# Patient Record
Sex: FEMALE | Race: WHITE
[De-identification: names, ages, dates, MRNs, and addresses within clinical notes are randomized per-mention and may not be internally consistent; named-entity substitution may affect disease eponyms.]

---

## 2018-11-27 ENCOUNTER — HOSPITAL ENCOUNTER (INPATIENT)
Dept: HOSPITAL 44 - OPSURG | Age: 53
LOS: 2 days | Discharge: HOME | DRG: 552 | End: 2018-11-29
Attending: FAMILY MEDICINE | Admitting: FAMILY MEDICINE
Payer: COMMERCIAL

## 2018-11-27 VITALS — BODY MASS INDEX: 38.7 KG/M2

## 2018-11-27 DIAGNOSIS — M51.37: Primary | ICD-10-CM

## 2018-11-27 PROCEDURE — 99238 HOSP IP/OBS DSCHRG MGMT 30/<: CPT

## 2018-11-27 PROCEDURE — 80048 BASIC METABOLIC PNL TOTAL CA: CPT

## 2018-11-27 PROCEDURE — 72100 X-RAY EXAM L-S SPINE 2/3 VWS: CPT

## 2018-11-27 PROCEDURE — S0028 INJECTION, FAMOTIDINE, 20 MG: HCPCS

## 2018-11-27 PROCEDURE — 63042 LAMINOTOMY SINGLE LUMBAR: CPT

## 2018-11-27 PROCEDURE — 85025 COMPLETE CBC W/AUTO DIFF WBC: CPT

## 2018-11-27 PROCEDURE — 81025 URINE PREGNANCY TEST: CPT

## 2018-11-27 PROCEDURE — 99231 SBSQ HOSP IP/OBS SF/LOW 25: CPT

## 2018-11-27 PROCEDURE — 99232 SBSQ HOSP IP/OBS MODERATE 35: CPT

## 2018-11-27 RX ADMIN — FLUCONAZOLE SCH MG: 150 TABLET ORAL at 18:14

## 2018-11-27 RX ADMIN — HEPARIN SODIUM SCH UNIT: 5000 INJECTION, SOLUTION INTRAVENOUS; SUBCUTANEOUS at 20:41

## 2018-11-27 RX ADMIN — ZOLPIDEM TARTRATE PRN MG: 5 TABLET ORAL at 21:45

## 2018-11-27 RX ADMIN — DOCUSATE SODIUM SCH MG: 100 CAPSULE, LIQUID FILLED ORAL at 20:32

## 2018-11-27 RX ADMIN — GABAPENTIN SCH MG: 300 CAPSULE ORAL at 18:14

## 2018-11-27 NOTE — HISTORY AND PHYSICAL REPORT
History of Present Illnes





- History of Present Illness


Reason for Visit: chronic lower back pain


History of Present Illness: 





Patient is a 53-year-old white female who stated in 2016 she fell off the 

balcony and felt two stories and sustained several burst fracture is to her 

lumbar spine as well as some other fractures to her thoracic spine. Patient 

states that she has subsequently had a posterior lumbar fusion that. Patient has

over the last six month been having increasing pain in her lower back. Was found

to have a loose screw in her sacral area. Patient stated she has been having 

some radicular pain in her legs bilaterally right side greater than left. 

Patient does have some intermittent numbness attend to come and go. Patient is 

not able to stand for a long period of time. Patient states that she is been 

having some difficulties with walking related to the pain.  Patient has been 

seen by Dr. Norris and was felt that she would benefit from anterior cervical 

fusion and cage placement. Patient subsequently have it done on the day of 

admission. Patient did not appear to have any intraoperative complications. 

Patient was transferred to the floor in stable condition.





- Past Medical History


Cardiac: HTN, Hyperlipidemia


CNS: Other (Decrease hearing)


Gastrointestinal: GERD, Irritable bowel disease


Heme/Onc: Anemia NOS


Hepatobiliary: Hep A/B/C (Hep C NOT TREATED)


Psych: Anxiety, Addictions (? HX OF SUBSTANCE ABUSE)


Musculoskeletal: Chronic low back pain, Osteoarthritis


Renal/: Acute renal failure (RESOLVED)





- Past Surgical History


Past Surgical History:  (x3), Tubal Ligation, Other (ORIF right ankle 

fx, posterior lumbar fusion 2016)





- Past Family History


  ** Mother


Family History: DM,  (66yo), Other (COPD)





  ** Brother 1


Family History:  (COPD)





- Past Social History


Smoke: No


Occupation: disabled


Alcohol: None


Drugs: None


Lives: Alone





- Health Maintenance


Health Maintenance: Influenza Vaccine, Mammogram (patient is scheduled for one)


Influenza Vaccine: Current for this Influenza Season


Pneumonia Vaccine: No


Resuscitation Status: 


Resusciation Status





Resuscitation Status             Full Code














- Unable to Obtain History


Unable to Obtain: No





Review of Systems





- Review of Systems


Constitutional: negative: Fever, Chills, Sweats, Weakness


Eyes: other (glasses).  negative: pain


ENT: negative: Nose Pain, Nose Discharge, Mouth Pain, Throat Swelling


Respiratory: SOB with Excertion.  negative: Cough, Dry, Hemoptysis, Pleuritic Pa

in, Sputum


Cardiovascular: negative: Chest Pain, Orthopnea, Edema, Light Headedness


Gastrointestinal: Diarrhea, Constipation.  negative: Nausea, Vomiting, Abdominal

Pain, Melena, Hematochezia


Genitourinary: Other (recent UTI treated).  negative: Dysuria, Frequency, 

Incontinence


Musculoskeletal: Back Pain


Skin: negative: Rash


Neurological: Numbness (intermittent to feet bilaterally)





- Medications/Allergies


Allergies/Adverse Reactions: 


                                    Allergies











Allergy/AdvReac Type Severity Reaction Status Date / Time


 


nalbuphine [From Nubain] AdvReac  Tremors Verified 18 16:51














Home Medications: 


                                Home Medications





Clonazepam [Klonopin] 2 mg PO TID 18 


Clonidine HCl [Catapres] 0.1 mg PO TID 18 


Fluoxetine HCl [Prozac] 40 mg PO D 18 


Gabapentin [Neurontin] 300 mg PO TID 18 


Hydroxyzine Pamoate 50 mg PO BID 18 


Ibuprofen [Ibu] 600 mg PO Q6 PRN 18 


Promethazine HCl [Phenergan] 25 mg PO Q6 PRN 18 


Tizanidine HCl [Zanaflex] 4 mg PO TID PRN 18 


Zolpidem Tartrate 10 mg PO HS 18 











Current Inpatient Medications: 


                          Current Inpatient Medications





Acetaminophen (Tylenol Extra Strength)  500 mg PO Q4 PRN


   PRN Reason: for mild pain 1-4/fever


Bisacodyl (Dulcolax)  10 mg RC DAILY PRN


   PRN Reason: Constipation


Diazepam (Valium)  5 mg PO Q8H PRN


   PRN Reason: Anxiety/muscle spasm


   Stop: 18 16:38


Diphenhydramine HCl (Benadryl)  25 mg PO Q4 PRN


   PRN Reason: Itching/Pruritis or insomnia


Docusate Sodium (Colace)  100 mg PO BID JJ


   Stop: 18 09:01


Fentanyl Citrate (Duragesic)  50 mcg IVP Q2H PRN


   PRN Reason: Moderate pain 5-7


   Stop: 18 16:38


Fentanyl Citrate (Duragesic)  100 mcg IVP Q2H PRN


   PRN Reason: For Severe Pain 8-10


   Stop: 18 16:38


Heparin Sodium (Porcine) (Heparin)  5,000 unit SQ Q12 JJ


Hydromorphone HCl (Dilaudid)  4 mg PO Q4H PRN


   PRN Reason: Severe Pain 8-10


   Stop: 18 16:38


Sodium Chloride (Normal Saline)  1,000 mls @ 75 mls/hr IV Q10H JJ


   Stop: 18 16:59


Promethazine HCl 25 mg/ Sodium (Chloride)  51 mls @ 600 mls/hr IV Q6 PRN


   PRN Reason: Nausea / Vomiting


   Stop: 18 16:38


Ketorolac Tromethamine (Toradol)  15 mg IVP Q6 PRN


   PRN Reason: For Mild Pain 1-4


   Stop: 18 16:38


Levalbuterol HCl (Xopenex)  1.25 mg NEB Q4 PRN


   PRN Reason: SOA, Dyspnea, or Wheezing


   Stop: 18 16:38


Morphine Sulfate (Depodur)  4 mg IVP Q2 PRN


   PRN Reason: For Severe Pain 8-10


   Stop: 18 16:38


Ondansetron HCl (Zofran 4 Mg/2 Ml)  4 mg IVP Q6H PRN


   PRN Reason: Nausea / Vomiting


   Stop: 18 16:38


Oxycodone HCl (Percolone)  10 mg PO Q4 PRN


   PRN Reason: For Moderate Pain 5-7


Oxycodone/Acetaminophen (Percocet 5-325 Mg Tablet)  1 each PO Q4 PRN


   PRN Reason: For pain 1-4 out of 10


Oxycodone/Acetaminophen (Percocet 5-325 Mg Tablet)  2 each PO Q4 PRN


   PRN Reason: Moderate pain 5-7














Exam





- Exam


Vital Signs: 


                             Vital Signs (72 hours)











  18





  16:33 16:39 17:01


 


Temperature 97.7 F  97.0 F L


 


Pulse Rate [ 86  91 H





Right]   


 


Respiratory 20  20





Rate   


 


Blood Pressure 155/92  151/76





[Right Arm]   


 


O2 Sat by Pulse 93 98 98





Oximetry   














General: Alert, Oriented to Person, Oriented to Place, Oriented to Time, 

Moderate distress, Obese


HEENT: Atraumatic, Mouth Mucous membr. moist/Pink, Nose Mucous membr. 

moist/Pink, Dentition Normal, Decreased Hearing Acuity


Neck: Normal Range of Motion.  No: Lymphadenopathy


Carotids: 





WNL


Thyroid: 





WNL


Lungs: Clear to auscultation, Normal air movement, Speaks full Sentences.  No: 

Wheezes, Rales, Rhonchi


Cardiovascular: Regular rate, Normal S1, Normal S2, No murmurs


Abdomen: Soft, Other (mild degeneralized tenderness), Decreased Bowel Sounds.  N

o: Distended, Rigid


Integumentary: Normal, Pink, Warm, Dry


Extremities: No clubbing, No cyanosis, Other (trace edema)


Neurological: Normal speech, Strength Equal Bilat, Normal tone, Sensation 

intact, Reflexes 2+


Psych/Mental Status: Mental status NL, Mood NL, Appropriate Affect, Intact 

Judgment





- Laboratory Results


Laboratory Results: 


                               Laboratory Results











  18





  Unknown


 


Urine HCG, Qual  Negative

















Assessment/Plan





- Assessment/Plan


(1) Chronic back pain


Status: Acute   Current Visit: Yes   


Qualifiers: 


   Sciatica laterality: sciatica of right side 


Assessment: 


Patient is status post anterior lumbar fusion with cage placement at the L5 S1 

level.


I discussed with the patient the need to ambulate early and often to help 

prevent DVT and to speed the recovery process.


Patient has been advised to do inspiratory spirometry as ordered to help prevent

pneumonia development. 


Patient will have CBC and CMP rechecked in AM to look for evidence of bleeding 

and recheck electrolytes. 


Diet will be advanced as tolerated. 








(2) Anxiety


Status: Acute   Current Visit: Yes   


Assessment: 


Patient will be continued on present medications and treatment








(3) Hepatitis C infection


Status: Acute   Current Visit: Yes   


Assessment: 


Universal precautions








(4) GERD without esophagitis


Status: Acute   Current Visit: Yes   


Assessment: 


Protonix po








VTE Assessment





- RISK FACTOR SCORE


VTE RISK FACTOR SCORES: AGE 40-60 YEARS, MINOR SURGERY/ ANESTHESIA TIME < 1 HOUR

## 2018-11-28 LAB
BASOPHILS NFR BLD: 0.3 % (ref 0–1.5)
EGFR (NON-AFRICAN): > 60
EOSINOPHIL NFR BLD: 1.1 % (ref 0–6.8)
MCH RBC QN AUTO: 29.1 PG (ref 28–34)
MCV RBC AUTO: 86 FL (ref 80–100)
MONOCYTES %: 5.8 % (ref 0–11)
NEUTROPHILS #: 9.1 # K/UL (ref 1.4–7.7)

## 2018-11-28 RX ADMIN — GABAPENTIN SCH MG: 300 CAPSULE ORAL at 13:26

## 2018-11-28 RX ADMIN — DOCUSATE SODIUM SCH MG: 100 CAPSULE, LIQUID FILLED ORAL at 21:10

## 2018-11-28 RX ADMIN — GABAPENTIN SCH MG: 300 CAPSULE ORAL at 08:24

## 2018-11-28 RX ADMIN — GABAPENTIN SCH MG: 300 CAPSULE ORAL at 17:39

## 2018-11-28 RX ADMIN — PANTOPRAZOLE SODIUM SCH MG: 40 TABLET, DELAYED RELEASE ORAL at 06:26

## 2018-11-28 RX ADMIN — HEPARIN SODIUM SCH UNIT: 5000 INJECTION, SOLUTION INTRAVENOUS; SUBCUTANEOUS at 08:23

## 2018-11-28 RX ADMIN — HEPARIN SODIUM SCH UNIT: 5000 INJECTION, SOLUTION INTRAVENOUS; SUBCUTANEOUS at 21:11

## 2018-11-28 RX ADMIN — DOCUSATE SODIUM SCH MG: 100 CAPSULE, LIQUID FILLED ORAL at 08:27

## 2018-11-28 RX ADMIN — FLUCONAZOLE SCH MG: 150 TABLET ORAL at 08:23

## 2018-11-28 NOTE — INPATIENT PROGRESS NOTE
Subjective





- Required Recertification Statement


I anticipate X number of days because-include discharge plan: 1 day





- Review of Systems


Subjective: 


Patient has been eating well. No nausea or vomiting noted. Has passed some 

flatus, no BM. Patient states that when she gets up to walk she is having some 

tingling sensation tot he posterior calf and thigh area up to the buttocks. It 

is a similar to the symptoms that she has had prior to surgery but is "100x 

worse". Patient denies any weakness. When she is laying down or sitting the pain

in her back is tolerable and she does not have the tingling in her right leg. 

Patient is getting up and ambulating some but has been refusing at times. 

Patient is using incentive spirometry as ordered. 








Objective





- Exam


Vitals and I&O: 


                                   Vital Signs











Temp  97.2 F L  11/28/18 06:00


 


Pulse  87   11/28/18 06:00


 


Resp  18   11/28/18 06:00


 


BP  146/72   11/28/18 06:00


 


Pulse Ox  96   11/28/18 06:00











                                 Intake & Output











 11/27/18 11/27/18 11/28/18





 11:59 23:59 11:59


 


Intake Total  50 1000


 


Output Total   400


 


Balance  50 600


 


Weight  108.862 kg 


 


Intake:   


 


  IV  50 1000


 


    Right upper arm/shoulder  50 1000





    area   


 


Output:   


 


  Urine   400


 


Other:   


 


  Voiding Method  Toilet Toilet


 


  # Voids  0 


 


  # Bowel Movements  0 














General: Alert, Oriented to Person, Oriented to Place, Oriented to Time, Mild 

distress


Neck: Supple, No JVD


Lungs: Clear to auscultation, Normal air movement.  No: Wheezes, Rales, Rhonchi


Cardiovascular: Regular rate, Normal S1, Normal S2, No murmurs


Abdomen: Soft, Other (mild difuse tenderness), Decreased Bowel Sounds (but 

improved over yesterday)


Extremities: No clubbing, No cyanosis, No edema


Skin: Normal, Pink, Warm, Dry


Neurological: Normal speech, Cranial nerves 3-12 NL, Reflexes 2+


Psych/Mental Status: Mental status NL, Mood NL, Appropriate Affect, Intact 

Judgment





- Results


Results: 


                               Laboratory Results











Urine HCG, Qual  Negative  (NEGATIVE)   11/27/18  Unknown

















Assessment/Plan





- Assessment/Plan


(1) Chronic back pain


Status: Acute   Current Visit: Yes   


Qualifiers: 


   Sciatica laterality: sciatica of right side 


Assessment: 


I have consulted with Dr Norris about patient symptoms and he feels that she is 

doing OK. Perhaps having some swelling that may be causing some transient nerve 

compression. She is aware that she may need to have a posterior fusion done 

also. 


Plan: 


Will continue with present orders and treatment. Encourage ambulation and 

continue to use spirometry








(2) Anxiety


Status: Chronic   Current Visit: Yes   


Assessment: 


stable








(3) Hepatitis C infection


Status: Chronic   Current Visit: Yes   





(4) GERD without esophagitis


Status: Chronic   Current Visit: Yes   


Assessment: 


stable

## 2018-11-29 VITALS — DIASTOLIC BLOOD PRESSURE: 68 MMHG | SYSTOLIC BLOOD PRESSURE: 138 MMHG

## 2018-11-29 RX ADMIN — HEPARIN SODIUM SCH UNIT: 5000 INJECTION, SOLUTION INTRAVENOUS; SUBCUTANEOUS at 10:25

## 2018-11-29 RX ADMIN — ZOLPIDEM TARTRATE PRN MG: 5 TABLET ORAL at 00:05

## 2018-11-29 RX ADMIN — FLUCONAZOLE SCH MG: 150 TABLET ORAL at 10:25

## 2018-11-29 RX ADMIN — GABAPENTIN SCH MG: 300 CAPSULE ORAL at 10:22

## 2018-11-29 RX ADMIN — PANTOPRAZOLE SODIUM SCH MG: 40 TABLET, DELAYED RELEASE ORAL at 06:45

## 2018-11-29 RX ADMIN — DOCUSATE SODIUM SCH MG: 100 CAPSULE, LIQUID FILLED ORAL at 10:24

## 2018-11-30 NOTE — DIAGNOSTIC IMAGING REPORT
[facility] 

[facility]

[address] [address] [place] [phone] [id number]

 

 

 

 

Report Submission Date: 2018 10:44:15 AM CST

Patient       Study

Name:   ELVIS ECKERT       Date:   2018 9:50:03 AM CST

MRN:   [id number]       Modality Type:   DX

Gender:   F       Description:   SPINE

:   67       Institution:   [facility]

Physician:   [facility]

     Accession:    [id number]

 

 

Examination: Plain film lumbar spine 



History: PT STATES LOW BACK PAIN AND PAIN DOWN RIGHT LEG (Hx) 



Findings: 3 views of the lumbar spine demonstrates extensive posterior fixation 
hardware in place. Disc replacement L5/S1. Compression deformities involving L1 
and L4. No prevertebral regularity. 



Impression: Extensive fixation hardware in place. Compression deformities L1 and
L4 - correlate with pertinent history and older films to determine chronicity.

 

Electronically signed on 2018 10:44:15 AM CST by:

Quan MORALES

## 2019-01-02 NOTE — DISCHARGE SUMMARY
Discharge Summary





- Discharge Sumary


Condition at Discharge: Stable


Home Medications: 


                                Ambulatory Orders











 Medication  Instructions  Recorded


 


Clonazepam [Klonopin] 2 mg PO TID 11/27/18


 


Clonidine HCl [Catapres] 0.1 mg PO TID 11/27/18


 


Fluoxetine HCl [Prozac] 40 mg PO D 11/27/18


 


Gabapentin [Neurontin] 300 mg PO TID 11/27/18


 


Hydroxyzine Pamoate 50 mg PO BID 11/27/18


 


Ibuprofen [Ibu] 600 mg PO Q6 PRN 11/27/18


 


Promethazine HCl [Phenergan] 25 mg PO Q6 PRN 11/27/18


 


Tizanidine HCl [Zanaflex] 4 mg PO TID PRN 11/27/18


 


Zolpidem Tartrate 10 mg PO HS 11/27/18


 


Hydrocodone/Acetaminophen 1 - 2 each PO Q4 PRN #84 tablet 11/29/18





[Hydrocodon-Acetaminoph 7.5-325]  











Consultations this Visit: None


Procedures this Visit: Other (Lumbar fusion with CAGE placement)


Allergies/Adverse Reactions: 


                                    Allergies











Allergy/AdvReac Type Severity Reaction Status Date / Time


 


nalbuphine [From Nubain] AdvReac  Tremors Verified 11/27/18 16:51














- Final Diagnosis


(1) Chronic back pain


Problems: 


s/p Lumbar fusion with CAGE placement








(2) Anxiety


Problems: 


stable on home medications








(3) Hepatitis C infection


Problems: 


treated

## 2019-01-03 NOTE — DISCHARGE SUMMARY
Discharge Summary





- Discharge Sumary


History of Present Illness: 


Patient is a 53-year-old white female who stated in 2016 she fell off the 

balcony and felt two stories and sustained several burst fracture is to her 

lumbar spine as well as some other fractures to her thoracic spine. Patient 

states that she has subsequently had a posterior lumbar fusion that. Patient has

over the last six month been having increasing pain in her lower back. Was found

to have a loose screw in her sacral area. Patient stated she has been having 

some radicular pain in her legs bilaterally right side greater than left. 

Patient does have some intermittent numbness attend to come and go. Patient is 

not able to stand for a long period of time. Patient states that she is been 

having some difficulties with walking related to the pain.  Patient has been 

seen by Dr. Norris and was felt that she would benefit from anterior lumbar 

fusion, L5-S-1 and cage placement. Patient subsequently have it done on the day 

of admission. Patient did not appear to have any intraoperative complications. 

Patient was transferred to the floor in stable condition.








Condition at Discharge: Stable


Home Medications: 


                                Ambulatory Orders











 Medication  Instructions  Recorded


 


Clonazepam [Klonopin] 2 mg PO TID 11/27/18


 


Clonidine HCl [Catapres] 0.1 mg PO TID 11/27/18


 


Fluoxetine HCl [Prozac] 40 mg PO D 11/27/18


 


Gabapentin [Neurontin] 300 mg PO TID 11/27/18


 


Hydroxyzine Pamoate 50 mg PO BID 11/27/18


 


Ibuprofen [Ibu] 600 mg PO Q6 PRN 11/27/18


 


Promethazine HCl [Phenergan] 25 mg PO Q6 PRN 11/27/18


 


Tizanidine HCl [Zanaflex] 4 mg PO TID PRN 11/27/18


 


Zolpidem Tartrate 10 mg PO HS 11/27/18


 


Hydrocodone/Acetaminophen 1 - 2 each PO Q4 PRN #84 tablet 11/29/18





[Hydrocodon-Acetaminoph 7.5-325]  











Consultations this Visit: None


Procedures this Visit: Other (anterior lumbar fusion and cage placement)


Allergies/Adverse Reactions: 


                                    Allergies











Allergy/AdvReac Type Severity Reaction Status Date / Time


 


nalbuphine [From Nubain] AdvReac  Tremors Verified 11/27/18 16:51











Discharge Summary: 





Patient did comlain of some tingling sensation to the left posterior calk area 

and thigh area. Patient stated that this is similar to what she is had prior to 

surgery but more intense. Patient did have some pain in her back area but with 

lying down or sitting the pain did improve. However on the day of discharge 

patient was complaining of more discomfort to which it had before. Dr. Norris with

consulted and it was felt that this was probably related to some swelling 

secondary to the surgery. Neurological exam was within normal limits other than 

the subjective tingling sensation. It was felt that the patient was stable left 

that she could be discharged home and managed as an outpatient. Patient was 

subsequently discharged in stable condition.





- Final Diagnosis


(1) Chronic back pain


Problems: 


Status post anterior L5/S1 lumbar fusion and cage placement.








(2) Anxiety


Problems: 


stable on home meds








(3) Hepatitis C infection


Problems: 


treated








(4) GERD without esophagitis


Problems: 


stable